# Patient Record
Sex: MALE | Race: WHITE | Employment: FULL TIME | URBAN - METROPOLITAN AREA
[De-identification: names, ages, dates, MRNs, and addresses within clinical notes are randomized per-mention and may not be internally consistent; named-entity substitution may affect disease eponyms.]

---

## 2017-01-24 ENCOUNTER — HOSPITAL ENCOUNTER (OUTPATIENT)
Age: 57
Discharge: HOME | End: 2017-01-24
Payer: COMMERCIAL

## 2017-01-24 VITALS
HEART RATE: 100 BPM | RESPIRATION RATE: 14 BRPM | DIASTOLIC BLOOD PRESSURE: 80 MMHG | OXYGEN SATURATION: 95 % | TEMPERATURE: 99 F | SYSTOLIC BLOOD PRESSURE: 135 MMHG

## 2017-01-24 DIAGNOSIS — L03.115 CELLULITIS OF RIGHT LOWER EXTREMITY: Primary | ICD-10-CM

## 2017-01-24 DIAGNOSIS — L24.9 IRRITANT CONTACT DERMATITIS, UNSPECIFIED TRIGGER: ICD-10-CM

## 2017-01-24 PROCEDURE — 99213 OFFICE O/P EST LOW 20 MIN: CPT

## 2017-01-24 PROCEDURE — 99204 OFFICE O/P NEW MOD 45 MIN: CPT

## 2017-01-24 RX ORDER — FAMOTIDINE 20 MG/1
20 TABLET ORAL 2 TIMES DAILY
COMMUNITY
End: 2017-03-08 | Stop reason: ALTCHOICE

## 2017-01-24 RX ORDER — CEPHALEXIN 500 MG/1
500 CAPSULE ORAL 4 TIMES DAILY
Qty: 40 CAPSULE | Refills: 0 | Status: SHIPPED | OUTPATIENT
Start: 2017-01-24 | End: 2017-02-03

## 2017-01-24 RX ORDER — ZOLPIDEM TARTRATE 5 MG/1
5 TABLET ORAL NIGHTLY PRN
Qty: 10 TABLET | Refills: 0 | Status: SHIPPED | OUTPATIENT
Start: 2017-01-24 | End: 2017-02-03

## 2017-01-24 NOTE — ED NOTES
Many open sores to ernestine lower legs. Left leg is worse than right. No obvious drng at present. Left leg with redness/swelling. No swelling to ankles/feet. Pedal pulses present/strong. Both feet warm/dry.

## 2017-01-24 NOTE — ED INITIAL ASSESSMENT (HPI)
Pt sts rash to ernestine shins and upper back intermitently for the past several years- gets worse at times, especially in the summer. Current rash present for the past 6 months- worsening in the past several weeks. No relief with OTC treatments.  Burning/itching

## 2017-01-25 NOTE — ED PROVIDER NOTES
Patient Seen in: [id number] [facility] [place]    History   Patient presents with:  Rash Skin Problem (integumentary)    Stated Complaint: skin problem    HPI  51-year-old male who presents to the IC  with complaints of bilateral leg sores/rash for the Constitutional: Negative for fever. HENT: Negative. Cardiovascular: Negative. Gastrointestinal: Negative. Skin: Positive for color change and rash. Rash to bilateral lower legs, left arm, right arm and upper back area.    Allergic/Immunolo ED Course   Labs Reviewed - No data to display  MDM    I discussed the diagnosis and need for followup with their primary care physician for further evaluation and care.  Patient states they understand diagnosis, followup plan and agree with and understand

## 2017-02-28 ENCOUNTER — HOSPITAL ENCOUNTER (OUTPATIENT)
Age: 57
Discharge: HOME | End: 2017-02-28
Attending: EMERGENCY MEDICINE
Payer: COMMERCIAL

## 2017-02-28 VITALS
SYSTOLIC BLOOD PRESSURE: 151 MMHG | DIASTOLIC BLOOD PRESSURE: 94 MMHG | TEMPERATURE: 98 F | RESPIRATION RATE: 16 BRPM | HEART RATE: 95 BPM | OXYGEN SATURATION: 98 %

## 2017-02-28 DIAGNOSIS — R21 RASH: Primary | ICD-10-CM

## 2017-02-28 PROCEDURE — 99213 OFFICE O/P EST LOW 20 MIN: CPT

## 2017-02-28 PROCEDURE — 99214 OFFICE O/P EST MOD 30 MIN: CPT

## 2017-02-28 RX ORDER — SULFAMETHOXAZOLE AND TRIMETHOPRIM 800; 160 MG/1; MG/1
1 TABLET ORAL 2 TIMES DAILY
Qty: 20 TABLET | Refills: 0 | Status: SHIPPED | OUTPATIENT
Start: 2017-02-28 | End: 2017-03-10

## 2017-02-28 RX ORDER — CEPHALEXIN 500 MG/1
500 CAPSULE ORAL 4 TIMES DAILY
Qty: 40 CAPSULE | Refills: 0 | Status: SHIPPED | OUTPATIENT
Start: 2017-02-28 | End: 2017-03-10

## 2017-02-28 NOTE — ED INITIAL ASSESSMENT (HPI)
Seen last month for similar rash to bilat lower legs. States was getting better on antibiotic and the cream. As soon as he stopped states it got worse again. States he fell today and all the scabs fell off the right lower leg.  On arrival multiple sores to

## 2017-02-28 NOTE — ED PROVIDER NOTES
Patient presents with:  Cellulitis (integumentary, infectious)    HPI:     Sarah Goddard is a 49year old male who presents with chief complaint of rash.   States for the past few years he has had an intermittent burning/itching/drainage rash to the bilater aid in his socks not sticking to the open wounds. Given Rx for bactroban as that was helpful at last visit. Recommend f/u with PCP in 2 days for wound recheck.   Recommend f/u with specialist - Dermatology and/or Rheumatology for concerns for underlying va

## 2017-03-07 ENCOUNTER — TELEPHONE (OUTPATIENT)
Dept: FAMILY MEDICINE CLINIC | Facility: CLINIC | Age: 57
End: 2017-03-07

## 2017-03-08 ENCOUNTER — OFFICE VISIT (OUTPATIENT)
Dept: FAMILY MEDICINE CLINIC | Facility: CLINIC | Age: 57
End: 2017-03-08

## 2017-03-08 VITALS
DIASTOLIC BLOOD PRESSURE: 72 MMHG | RESPIRATION RATE: 16 BRPM | HEART RATE: 104 BPM | OXYGEN SATURATION: 98 % | HEIGHT: 69.5 IN | BODY MASS INDEX: 29.41 KG/M2 | TEMPERATURE: 99 F | SYSTOLIC BLOOD PRESSURE: 138 MMHG | WEIGHT: 203.13 LBS

## 2017-03-08 DIAGNOSIS — R21 RASH: Primary | ICD-10-CM

## 2017-03-08 PROCEDURE — 99203 OFFICE O/P NEW LOW 30 MIN: CPT | Performed by: FAMILY MEDICINE

## 2017-03-08 RX ORDER — PREDNISONE 20 MG/1
20 TABLET ORAL DAILY
Qty: 21 TABLET | Refills: 0 | Status: SHIPPED | OUTPATIENT
Start: 2017-03-08 | End: 2017-03-17

## 2017-03-08 NOTE — PROGRESS NOTES
Anna Khan is a 49year old male. CC:  Patient presents with: Infection: both legs per pt       HPI:  F/u rash/cellulitis treated in the UC with bactrim, Keflex and Bactroban. It is doing no better.   He reveals having issues with rashes on his ext applicable  EXTREMITIES: No clubbing, cyanosis or edema  RECTAL: not examined  GENITAL: not examined  LYMPH: no supraclavicular nodes  MUSCULOSKELETAL: normal ambulation  NEURO: not examined     ASSESSMENT AND PLAN    1.  Rash  Take prescribed medications a

## 2017-03-16 ENCOUNTER — TELEPHONE (OUTPATIENT)
Dept: FAMILY MEDICINE CLINIC | Facility: CLINIC | Age: 57
End: 2017-03-16

## 2017-03-16 NOTE — TELEPHONE ENCOUNTER
That can be a side effect of the medication. However, sometimes the prednisone can unmask an elevated eye pressure.  I would have him see his Optometrist in the near future for a comprehensive eye exam. Thanks

## 2017-03-16 NOTE — TELEPHONE ENCOUNTER
Patient advised of the information per Dr. Alvaro Rosales   Patient states that he has not taken the prednisone for 3 days- has the blurry vision and has a headache  Patient states that he will find on Optometrist -   Patient advised of Dr. Ranjit Larose office - he d

## 2017-03-20 ENCOUNTER — TELEPHONE (OUTPATIENT)
Dept: FAMILY MEDICINE CLINIC | Facility: CLINIC | Age: 57
End: 2017-03-20

## 2017-03-20 NOTE — TELEPHONE ENCOUNTER
He needs to see an eye doctor. Sometimes oral steroids can unmask elevated eye pressure. If he feels real bad then go to ER tonight.

## 2017-03-20 NOTE — TELEPHONE ENCOUNTER
Called and notified patient of information given. He states he will call and try to get in with an eye doctor as soon as he can.  Notified patient to let us know if the issue is due to the prednisone and we can put on allergy list.   Patient notified that h

## 2017-03-20 NOTE — TELEPHONE ENCOUNTER
Called and spoke to patient he states that he finished the prednisone 5 days ago. Patient states the eye blurriness started the last two days of medication. Both eyes are blurry, having difficultly driving cannot see more than 10 feet in front of him.  E

## 2017-11-07 ENCOUNTER — OFFICE VISIT (OUTPATIENT)
Dept: FAMILY MEDICINE CLINIC | Facility: CLINIC | Age: 57
End: 2017-11-07

## 2017-11-07 VITALS
HEART RATE: 84 BPM | WEIGHT: 198 LBS | BODY MASS INDEX: 28.67 KG/M2 | HEIGHT: 69.5 IN | RESPIRATION RATE: 16 BRPM | TEMPERATURE: 98 F | SYSTOLIC BLOOD PRESSURE: 138 MMHG | DIASTOLIC BLOOD PRESSURE: 76 MMHG

## 2017-11-07 DIAGNOSIS — R21 RASH: Primary | ICD-10-CM

## 2017-11-07 DIAGNOSIS — R73.9 HYPERGLYCEMIA: ICD-10-CM

## 2017-11-07 PROCEDURE — 87147 CULTURE TYPE IMMUNOLOGIC: CPT | Performed by: FAMILY MEDICINE

## 2017-11-07 PROCEDURE — 83036 HEMOGLOBIN GLYCOSYLATED A1C: CPT | Performed by: FAMILY MEDICINE

## 2017-11-07 PROCEDURE — 36415 COLL VENOUS BLD VENIPUNCTURE: CPT | Performed by: FAMILY MEDICINE

## 2017-11-07 PROCEDURE — 87070 CULTURE OTHR SPECIMN AEROBIC: CPT | Performed by: FAMILY MEDICINE

## 2017-11-07 PROCEDURE — 80050 GENERAL HEALTH PANEL: CPT | Performed by: FAMILY MEDICINE

## 2017-11-07 PROCEDURE — 87205 SMEAR GRAM STAIN: CPT | Performed by: FAMILY MEDICINE

## 2017-11-07 PROCEDURE — 87186 SC STD MICRODIL/AGAR DIL: CPT | Performed by: FAMILY MEDICINE

## 2017-11-07 PROCEDURE — 99214 OFFICE O/P EST MOD 30 MIN: CPT | Performed by: FAMILY MEDICINE

## 2017-11-07 RX ORDER — IVERMECTIN 3 MG/1
TABLET ORAL
Qty: 30 TABLET | Refills: 0 | Status: SHIPPED | OUTPATIENT
Start: 2017-11-07 | End: 2017-11-21

## 2017-11-07 RX ORDER — TRAMADOL HYDROCHLORIDE 50 MG/1
50 TABLET ORAL EVERY 6 HOURS PRN
Qty: 30 TABLET | Refills: 0 | Status: SHIPPED
Start: 2017-11-07 | End: 2018-01-22

## 2017-11-07 NOTE — PROGRESS NOTES
Yesi Stoll is a 56year old male. CC:  Patient presents with:  Rash: on both legs and both arms per pt      HPI:  Rash on arms and legs for a few years. It has now gone to his trunk. He has intense itching at night.  They are oozing and painful duri supraclavicular nodes  MUSCULOSKELETAL: normal ambulation  NEURO: not examined     ASSESSMENT AND PLAN    1.  Rash  Uncertain etiology, will treat for possible [place] Scabies with Ivermictin  Await results   He may need to go to [facility] in

## 2017-11-08 DIAGNOSIS — E11.628 CONTROLLED TYPE 2 DIABETES MELLITUS WITH OTHER SKIN COMPLICATION, WITHOUT LONG-TERM CURRENT USE OF INSULIN (HCC): ICD-10-CM

## 2017-11-08 DIAGNOSIS — R73.9 HYPERGLYCEMIA: Primary | ICD-10-CM

## 2017-11-08 PROBLEM — E11.9 DIABETES MELLITUS TYPE II, CONTROLLED (HCC): Status: ACTIVE | Noted: 2017-11-08

## 2017-11-10 ENCOUNTER — TELEPHONE (OUTPATIENT)
Dept: FAMILY MEDICINE CLINIC | Facility: CLINIC | Age: 57
End: 2017-11-10

## 2017-11-10 RX ORDER — SULFAMETHOXAZOLE AND TRIMETHOPRIM 800; 160 MG/1; MG/1
1 TABLET ORAL 2 TIMES DAILY
Qty: 20 TABLET | Refills: 0 | Status: SHIPPED | OUTPATIENT
Start: 2017-11-10 | End: 2017-12-04

## 2017-11-10 NOTE — TELEPHONE ENCOUNTER
----- Message from Destini Holt MD sent at 11/10/2017  7:10 AM CST -----  Please let patient know or leave message that his skin culture did grow a bacteria called Staph. It is susceptible to Bactrim.  Place him on Bactrim DS, 1 tab bid x 10 days, #20, 0

## 2017-11-10 NOTE — TELEPHONE ENCOUNTER
Left message on patients voice mail with the information per Dr. Prachi Hudson. Prescription sent to [place]. Advised patient to call the office and set up an appointment with Dr. Prachi Hudson in 6 weeks. Phone number provided.

## 2017-12-04 ENCOUNTER — TELEPHONE (OUTPATIENT)
Dept: FAMILY MEDICINE CLINIC | Facility: CLINIC | Age: 57
End: 2017-12-04

## 2017-12-04 RX ORDER — SULFAMETHOXAZOLE AND TRIMETHOPRIM 800; 160 MG/1; MG/1
TABLET ORAL
Qty: 60 TABLET | Refills: 0 | Status: SHIPPED | OUTPATIENT
Start: 2017-12-04 | End: 2018-01-04

## 2017-12-04 NOTE — TELEPHONE ENCOUNTER
I have refilled the medication.  I want him to take it twice per day for the next 30 days to see if we can eradicate the infection, thanks

## 2017-12-04 NOTE — TELEPHONE ENCOUNTER
Spoke with pt- he would like a refill on Bactrim. Pt states that Bactrim did help with Staph infection. Has been off the medication and infection is coming back and with a \"vengence\".   Pt states he is in pain and his legs are burning when the pustule

## 2017-12-08 ENCOUNTER — TELEPHONE (OUTPATIENT)
Dept: FAMILY MEDICINE CLINIC | Facility: CLINIC | Age: 57
End: 2017-12-08

## 2018-01-22 ENCOUNTER — OFFICE VISIT (OUTPATIENT)
Dept: FAMILY MEDICINE CLINIC | Facility: CLINIC | Age: 58
End: 2018-01-22

## 2018-01-22 VITALS
BODY MASS INDEX: 30.19 KG/M2 | HEIGHT: 68 IN | WEIGHT: 199.19 LBS | DIASTOLIC BLOOD PRESSURE: 84 MMHG | RESPIRATION RATE: 12 BRPM | TEMPERATURE: 99 F | HEART RATE: 86 BPM | SYSTOLIC BLOOD PRESSURE: 152 MMHG

## 2018-01-22 DIAGNOSIS — E11.628 CONTROLLED TYPE 2 DIABETES MELLITUS WITH OTHER SKIN COMPLICATION, WITHOUT LONG-TERM CURRENT USE OF INSULIN (HCC): ICD-10-CM

## 2018-01-22 DIAGNOSIS — R21 RASH: Primary | ICD-10-CM

## 2018-01-22 PROCEDURE — 82043 UR ALBUMIN QUANTITATIVE: CPT | Performed by: FAMILY MEDICINE

## 2018-01-22 PROCEDURE — 36415 COLL VENOUS BLD VENIPUNCTURE: CPT | Performed by: FAMILY MEDICINE

## 2018-01-22 PROCEDURE — 83036 HEMOGLOBIN GLYCOSYLATED A1C: CPT | Performed by: FAMILY MEDICINE

## 2018-01-22 PROCEDURE — 82570 ASSAY OF URINE CREATININE: CPT | Performed by: FAMILY MEDICINE

## 2018-01-22 PROCEDURE — 99214 OFFICE O/P EST MOD 30 MIN: CPT | Performed by: FAMILY MEDICINE

## 2018-01-22 RX ORDER — TRAMADOL HYDROCHLORIDE 50 MG/1
50 TABLET ORAL EVERY 6 HOURS PRN
Qty: 30 TABLET | Refills: 0 | Status: SHIPPED
Start: 2018-01-22 | End: 2018-10-01

## 2018-01-22 NOTE — PROGRESS NOTES
Evy Boyd is a 56year old male. CC:  Patient presents with:  Derm Problem      HPI:  F/u rash. Redness about the lesions is gone. However the lesions themselves are not healing and are still cracking and causing him pain.  He requests refill of Ul nodes  MUSCULOSKELETAL: normal ambulation  NEURO: not examined   Bilateral barefoot skin diabetic exam is normal, visualized feet and the appearance is normal.  Bilateral monofilament/sensation of both feet is normal.  Pulsation pedal pulse exam of both lo

## 2018-01-23 PROBLEM — E11.65 UNCONTROLLED TYPE 2 DIABETES MELLITUS (HCC): Status: ACTIVE | Noted: 2017-11-08

## 2018-01-23 LAB
CREAT UR-SCNC: 95.6 MG/DL
EST. AVERAGE GLUCOSE BLD GHB EST-MCNC: 166 MG/DL (ref 68–126)
HBA1C MFR BLD HPLC: 7.4 % (ref ?–5.7)
MICROALBUMIN UR-MCNC: 4.72 MG/DL
MICROALBUMIN/CREAT 24H UR-RTO: 49.4 UG/MG (ref ?–30)

## 2018-10-01 ENCOUNTER — TELEPHONE (OUTPATIENT)
Dept: FAMILY MEDICINE CLINIC | Facility: CLINIC | Age: 58
End: 2018-10-01

## 2018-10-01 ENCOUNTER — OFFICE VISIT (OUTPATIENT)
Dept: FAMILY MEDICINE CLINIC | Facility: CLINIC | Age: 58
End: 2018-10-01
Payer: COMMERCIAL

## 2018-10-01 DIAGNOSIS — L29.8 CHRONIC PRURITIC RASH IN ADULT: Primary | ICD-10-CM

## 2018-10-01 PROCEDURE — 99213 OFFICE O/P EST LOW 20 MIN: CPT | Performed by: FAMILY MEDICINE

## 2018-10-01 NOTE — TELEPHONE ENCOUNTER
LMTCB - specific provider in mind for [facility]? Referral can be updated with new provider information once received.

## 2018-10-01 NOTE — PROGRESS NOTES
HPI:    Patient ID: Kalpesh Wells is a 66year old male. Patient presents with:  Rash    HPI   Here for rash over bilateral legs  Onset: 10 year. Flares every few months. Rash flared up past 2 weeks  Itchy. Skins gets dry and cracked and becomes painful. adult  (primary encounter diagnosis)  Rash: topical bacitracin. Must avoid itching. Should not disrupt scabs - allowing skin to heal. Keep skin covered at work. Refer to derm. Elevated BP noted - pt does not want to address today.  Advised on diet - low sa

## 2018-10-01 NOTE — TELEPHONE ENCOUNTER
Pt's wife called stated he just saw Dr. Fabiola Ag and she gave him a referral to derm and she was wondering if she could give a referral to a derm in [place] not [place].

## 2018-10-02 VITALS
HEIGHT: 68 IN | DIASTOLIC BLOOD PRESSURE: 88 MMHG | BODY MASS INDEX: 30.16 KG/M2 | HEART RATE: 95 BPM | SYSTOLIC BLOOD PRESSURE: 154 MMHG | TEMPERATURE: 98 F | WEIGHT: 199 LBS | OXYGEN SATURATION: 98 % | RESPIRATION RATE: 20 BRPM

## 2019-04-16 ENCOUNTER — TELEPHONE (OUTPATIENT)
Dept: FAMILY MEDICINE CLINIC | Facility: CLINIC | Age: 59
End: 2019-04-16
